# Patient Record
Sex: FEMALE | URBAN - METROPOLITAN AREA
[De-identification: names, ages, dates, MRNs, and addresses within clinical notes are randomized per-mention and may not be internally consistent; named-entity substitution may affect disease eponyms.]

---

## 2021-04-17 ENCOUNTER — AMBULATORY - HEALTHEAST (OUTPATIENT)
Dept: CARE COORDINATION | Facility: HOSPITAL | Age: 26
End: 2021-04-17

## 2021-07-15 NOTE — PROGRESS NOTES
"S: 24 y/o female presented to the [facility] ED with anxiety and possible na.    B: Hx MDD, anxiety, mood d/o.  Pt's family brought her to the ER over concerns for SI and anxiety over the past 3 days due to her getting the COVID vaccine.  MD reported pressured and tangential speech.  ER reported pt thought she saw her boyfriend getting shot in the ER.  Pt's family reported she has been more confused over the last 2-3 days, making suicidal statements and had been writing lists of reasons not to kill herself.  Pt denied SI, HI or hallucinations and felt she was pressured into going to the ER by her family.   Hx of being prescribed Zoloft last year but only took it for a month.        A: 72 hr hold. Per HPI: \"Presentation concerning for acute na, possibly incipient psychosis.  Despite the fact she is not suicidal, I believe she does represent a danger in that she hs no insight into her condition.\"  No acute medical concerns.  Negative for COVID.  Drug screen negative  Glucose 116  Potassium 3.2  BUN 5  WBC 12.6  Absolute neutrophil 10.4    R: 0151 Dr. Aldrich accepts for 4500/Delia.  Placed in queue at 0154.  Unit notified and clinical faxed at 0157.  ED notified at 0200.    "

## 2021-07-20 PROBLEM — R45.851 SUICIDAL IDEATION: Status: ACTIVE | Noted: 2021-04-17

## 2021-07-20 PROBLEM — F23 BRIEF PSYCHOTIC DISORDER (H): Status: ACTIVE | Noted: 2021-04-17
